# Patient Record
Sex: MALE | Race: OTHER | NOT HISPANIC OR LATINO | Employment: FULL TIME | URBAN - METROPOLITAN AREA
[De-identification: names, ages, dates, MRNs, and addresses within clinical notes are randomized per-mention and may not be internally consistent; named-entity substitution may affect disease eponyms.]

---

## 2023-02-15 ENCOUNTER — HOSPITAL ENCOUNTER (EMERGENCY)
Facility: MEDICAL CENTER | Age: 33
End: 2023-02-15
Attending: EMERGENCY MEDICINE
Payer: COMMERCIAL

## 2023-02-15 VITALS
OXYGEN SATURATION: 100 % | SYSTOLIC BLOOD PRESSURE: 132 MMHG | HEIGHT: 75 IN | HEART RATE: 75 BPM | BODY MASS INDEX: 21 KG/M2 | WEIGHT: 168.87 LBS | TEMPERATURE: 99.6 F | RESPIRATION RATE: 17 BRPM | DIASTOLIC BLOOD PRESSURE: 84 MMHG

## 2023-02-15 DIAGNOSIS — S91.312A LACERATION OF LEFT FOOT, INITIAL ENCOUNTER: ICD-10-CM

## 2023-02-15 DIAGNOSIS — S96.822A LACERATION OF EXTENSOR TENDON OF LEFT FOOT, INITIAL ENCOUNTER: ICD-10-CM

## 2023-02-15 PROCEDURE — A9270 NON-COVERED ITEM OR SERVICE: HCPCS | Performed by: EMERGENCY MEDICINE

## 2023-02-15 PROCEDURE — 302874 HCHG BANDAGE ACE 2 OR 3""

## 2023-02-15 PROCEDURE — 304999 HCHG REPAIR-SIMPLE/INTERMED LEVEL 1

## 2023-02-15 PROCEDURE — 29515 APPLICATION SHORT LEG SPLINT: CPT

## 2023-02-15 PROCEDURE — 700111 HCHG RX REV CODE 636 W/ 250 OVERRIDE (IP): Performed by: EMERGENCY MEDICINE

## 2023-02-15 PROCEDURE — 99284 EMERGENCY DEPT VISIT MOD MDM: CPT

## 2023-02-15 PROCEDURE — 303747 HCHG EXTRA SUTURE

## 2023-02-15 PROCEDURE — 90471 IMMUNIZATION ADMIN: CPT

## 2023-02-15 PROCEDURE — 700102 HCHG RX REV CODE 250 W/ 637 OVERRIDE(OP): Performed by: EMERGENCY MEDICINE

## 2023-02-15 PROCEDURE — 90715 TDAP VACCINE 7 YRS/> IM: CPT | Performed by: EMERGENCY MEDICINE

## 2023-02-15 RX ORDER — CEPHALEXIN 500 MG/1
1000 CAPSULE ORAL ONCE
Status: COMPLETED | OUTPATIENT
Start: 2023-02-15 | End: 2023-02-15

## 2023-02-15 RX ORDER — KETOROLAC TROMETHAMINE 10 MG/1
10 TABLET, FILM COATED ORAL 3 TIMES DAILY PRN
Qty: 15 TABLET | Refills: 0 | Status: SHIPPED | OUTPATIENT
Start: 2023-02-15

## 2023-02-15 RX ORDER — IBUPROFEN 600 MG/1
600 TABLET ORAL ONCE
Status: COMPLETED | OUTPATIENT
Start: 2023-02-15 | End: 2023-02-15

## 2023-02-15 RX ORDER — LIDOCAINE HYDROCHLORIDE 20 MG/ML
20 INJECTION, SOLUTION INFILTRATION; PERINEURAL ONCE
Status: DISCONTINUED | OUTPATIENT
Start: 2023-02-15 | End: 2023-02-16 | Stop reason: HOSPADM

## 2023-02-15 RX ORDER — CEPHALEXIN 500 MG/1
500 CAPSULE ORAL 4 TIMES DAILY
Qty: 40 CAPSULE | Refills: 0 | Status: ACTIVE | OUTPATIENT
Start: 2023-02-15

## 2023-02-15 RX ADMIN — CLOSTRIDIUM TETANI TOXOID ANTIGEN (FORMALDEHYDE INACTIVATED), CORYNEBACTERIUM DIPHTHERIAE TOXOID ANTIGEN (FORMALDEHYDE INACTIVATED), BORDETELLA PERTUSSIS TOXOID ANTIGEN (GLUTARALDEHYDE INACTIVATED), BORDETELLA PERTUSSIS FILAMENTOUS HEMAGGLUTININ ANTIGEN (FORMALDEHYDE INACTIVATED), BORDETELLA PERTUSSIS PERTACTIN ANTIGEN, AND BORDETELLA PERTUSSIS FIMBRIAE 2/3 ANTIGEN 0.5 ML: 5; 2; 2.5; 5; 3; 5 INJECTION, SUSPENSION INTRAMUSCULAR at 22:42

## 2023-02-15 RX ADMIN — CEPHALEXIN 1000 MG: 500 CAPSULE ORAL at 23:01

## 2023-02-15 RX ADMIN — IBUPROFEN 600 MG: 600 TABLET, FILM COATED ORAL at 23:01

## 2023-02-15 ASSESSMENT — PAIN DESCRIPTION - PAIN TYPE: TYPE: ACUTE PAIN

## 2023-02-16 PROBLEM — M79.672 LEFT FOOT PAIN: Status: ACTIVE | Noted: 2023-02-16

## 2023-02-16 NOTE — ED NOTES
PT ambulated from ED lobby to TCS 19 with steady gait. Last Tetanus vaccine received 2017 per PT. +CSM L foot.

## 2023-02-16 NOTE — DISCHARGE INSTRUCTIONS
Call [facility] first thing in the morning to be seen by Dr. Darnell.  Keep the splint and dressing on until seen by him.  Take the antibiotics in the morning and ibuprofen.  Keep your foot elevated tonight.

## 2023-02-16 NOTE — ED TRIAGE NOTES
"Chief Complaint   Patient presents with    Laceration     To L foot.  Patient dropped a knife on the foot.  Wound is about 2cm in length.  Bleeding controlled at this time with dressing.  Pt unsure at this time about tetanus vaccination       Pt ambulated to triage. Pt A&Ox4, came in for above complaint.     Pt to lobby . Pt educated on alerting staff in changes to condition. Pt verbalized understanding.     BP (!) 175/103   Pulse 92   Temp 36.3 °C (97.4 °F) (Temporal)   Resp 18   Ht 1.905 m (6' 3\")   Wt 76.6 kg (168 lb 14 oz)   SpO2 97%   BMI 21.11 kg/m²     "

## 2023-02-16 NOTE — ED PROVIDER NOTES
"ER Provider Note    Scribed for Lexi Kat D.o. by Loy Tiwari. 2/15/2023  10:16 PM    Primary Care Provider: No primary care provider noted.    CHIEF COMPLAINT  Chief Complaint   Patient presents with    Laceration     To L foot.  Patient dropped a knife on the foot.  Wound is about 2cm in length.  Bleeding controlled at this time with dressing.  Pt unsure at this time about tetanus vaccination     LIMITATION TO HISTORY   None    HPI/ROS  OUTSIDE HISTORIAN(S):  None    EXTERNAL RECORDS REVIEWED  No prior records available for review    Dennis Bullard is a 32 y.o. male who presents to the ED for evaluation of a left foot laceration onset prior to arrival. The patient states that while preparing dinner earlier tonight, he accidentally dropped a moderately sized  knife  onto his left foot. He sustained a laceration to the top of his left foot. Bleeding is controlled at this time. Associated symptoms include left foot pain and reduced movement of the left great toe. Dennis denies any loss of sensation to the left foot. No alleviating or exacerbating factors noted. He is unsure when his most recent tetanus vaccine was administered.    PAST MEDICAL HISTORY  History reviewed. No pertinent past medical history.    SURGICAL HISTORY  History reviewed. No pertinent surgical history.    FAMILY HISTORY  History reviewed. No pertinent family history.    SOCIAL HISTORY   reports that he has never smoked. He has never used smokeless tobacco. He reports that he does not drink alcohol and does not use drugs.    CURRENT MEDICATIONS  Previous Medications    No medications on file       ALLERGIES  Patient has no allergy information on record.    PHYSICAL EXAM  BP (!) 175/103   Pulse 92   Temp 36.3 °C (97.4 °F) (Temporal)   Resp 18   Ht 1.905 m (6' 3\")   Wt 76.6 kg (168 lb 14 oz)   SpO2 97%   BMI 21.11 kg/m²     Constitutional: Patient is well developed, well nourished.  Mild distress.  HENT: Normocephalic, " atraumatic. Oropharynx moist without erythema or exudates.  Eyes: PERRL, EOMI    Lymphatic: No lymphadenopathy noted.   Cardiovascular: Normal heart rate and Regular rhythm. No murmur  Thorax & Lungs: Clear and equal breath sounds with good excursion. No respiratory distress  Abdomen: Bowel sounds normal in all four quadrants. Soft,nontender, no rebound , guarding, palpable masses.   Skin: Warm, Dry   Extremities: 2 cm laceration through the dermis just below the DIP joint of the left great toe. There appears to be extensor tendon involvement. Peripheral pulses 4/4 normal two-point tactile sensation.  Musculoskeletal: Normal range of motion in all major joints. No tenderness to palpation or major deformities noted.   Neurologic: Alert & oriented x 3, Normal motor function, Normal sensory function,  Left foot is neurovascularly intact.   Psychiatric: Affect normal, Judgment normal, Mood normal.    DIAGNOSTIC STUDIES & PROCEDURES    Laceration Repair Procedure Note    Indication: Laceration    Procedure: The patient was placed in the appropriate position and anesthesia around the laceration was obtained by infiltration using 2% Lidocaine without epinephrine. The area was then irrigated with normal saline. Laceration was explored and there appears to be 100% extensor tendon involvement, no foreign bodies visualized. The laceration was closed with 5-0 Ethilon using interrupted sutures. There were no additional lacerations requiring repair. The wound area was then dressed with a bandage.      Total repaired wound length: 2 cm.     Other Items: Suture count: 5    The patient tolerated the procedure well.    Complications: None    COURSE & MEDICAL DECISION MAKING    ED Observation Status? No; Patient does not meet criteria for ED Observation.     INITIAL ASSESSMENT AND PLAN  Care Narrative:       10:16 PM - Patient seen and evaluated at bedside. Dennis Bullard is a 32 y.o. male who presents with a left foot laceration  onset prior to arrival. Patient will be treated with Adacel 0.5 mL to update his tetanus vaccine. Discussed performing a laceration repair, he understands and agrees to the plan of care.     10:20 PM - Laceration repair procedure performed by me as noted above. Will refer the patient to Orthopedics for further evaluation of the tendon involvement from his laceration. Nursing staff will place the patient in a temporary splint and provide crutches. He will receive an initial dose of antibiotics in the ED and I will include a prescription for the remaining course.     10:45 PM - Paged Orthopedics. Ordered Motrin 600 mg and Keflex 1000 mg to treat the patient.     10:50 PM - I discussed the patient's case and the above findings with Dr. Darnell (Orthopedics) who relayed to have the patient follow up with clinic tomorrow.     11:16 PM - Patient was reevaluated at bedside, I have reviewed the splint placed by nursing staff. Explained my consult with Dr. Darnell as noted above. Instructed the patient to keep the splint and dressing until he is seen by Orthopedics. Complete the course of antibiotics and medicate with Ibuprofen as needed for pain. Keep your foot elevated tonight. Discussed plan for discharge; I advised the patient to follow-up with Dr. Darnell (PCP) as soon as possible, and to return to the [facility] ED with any new or worsening symptoms. Patient was given the opportunity for questions. I addressed all questions or concerns at this time and they verbalize agreement to the plan of care.                      DISPOSITION AND DISCUSSIONS    I have discussed management of the patient with the following physicians and MIKE's: Dr. Darnell (Orthopedics)    Decision tools and prescription drugs considered including, but not limited to: Antibiotics Keflex 500 mg #40, Toradol 10 mg #15 .    The patient will return for new or worsening symptoms and is stable at the time of discharge.    DISPOSITION:  Patient will be  discharged home in stable condition.    FOLLOW UP:  Asif Darnell M.D.  [address] [place] [address]  [phone]    Schedule an appointment as soon as possible for a visit in 1 day  call first thing in the am    OUTPATIENT MEDICATIONS:  New Prescriptions    CEPHALEXIN (KEFLEX) 500 MG CAP    Take 1 Capsule by mouth 4 times a day.    KETOROLAC (TORADOL) 10 MG TAB    Take 1 Tablet by mouth 3 times a day as needed for Moderate Pain. With food     FINAL IMPRESSION   1. Laceration of left foot, initial encounter    2. Laceration of extensor tendon of left foot, initial encounter    3.      Laceration repair procedure performed by me     Loy NUEÑZ (Scribe), am scribing for, and in the presence of, Lexi Kat D.O..    Electronically signed by: Loy Tiwari (Scribe), 2/15/2023    Lexi NUÑEZ D.O. personally performed the services described in this documentation, as scribed by Loy Tiwari in my presence, and it is both accurate and complete.    The note accurately reflects work and decisions made by me.  Lexi Kat D.O.  2/16/2023  5:53 AM